# Patient Record
Sex: FEMALE | URBAN - METROPOLITAN AREA
[De-identification: names, ages, dates, MRNs, and addresses within clinical notes are randomized per-mention and may not be internally consistent; named-entity substitution may affect disease eponyms.]

---

## 2017-08-01 ENCOUNTER — APPOINTMENT (RX ONLY)
Dept: URBAN - METROPOLITAN AREA CLINIC 24 | Facility: CLINIC | Age: 62
Setting detail: DERMATOLOGY
End: 2017-08-01

## 2017-08-01 DIAGNOSIS — L90.5 SCAR CONDITIONS AND FIBROSIS OF SKIN: ICD-10-CM

## 2017-08-01 DIAGNOSIS — L57.8 OTHER SKIN CHANGES DUE TO CHRONIC EXPOSURE TO NONIONIZING RADIATION: ICD-10-CM

## 2017-08-01 PROCEDURE — ? TREATMENT REGIMEN

## 2017-08-01 PROCEDURE — ? COUNSELING

## 2017-08-01 PROCEDURE — ? OTHER (COSMETIC)

## 2017-08-01 PROCEDURE — ? PRESCRIPTION

## 2017-08-01 PROCEDURE — 99202 OFFICE O/P NEW SF 15 MIN: CPT

## 2017-08-01 RX ORDER — TRETINOIN 1 MG/G
PEA SIZE CREAM TOPICAL QHS
Qty: 1 | Refills: 6 | Status: ERX | COMMUNITY
Start: 2017-08-01

## 2017-08-01 RX ADMIN — TRETINOIN PEA SIZE: 1 CREAM TOPICAL at 20:33

## 2017-08-01 ASSESSMENT — LOCATION DETAILED DESCRIPTION DERM
LOCATION DETAILED: LEFT INFERIOR CENTRAL MALAR CHEEK
LOCATION DETAILED: RIGHT MEDIAL FOREHEAD

## 2017-08-01 ASSESSMENT — LOCATION ZONE DERM: LOCATION ZONE: FACE

## 2017-08-01 ASSESSMENT — LOCATION SIMPLE DESCRIPTION DERM
LOCATION SIMPLE: RIGHT FOREHEAD
LOCATION SIMPLE: LEFT CHEEK

## 2017-08-01 NOTE — PROCEDURE: TREATMENT REGIMEN
Samples Given: -Cetaphil Oil Control foaming wash\\n-Neutrogena ultra gentle foaming face wash\\n-Neutrogena ultra gentle creamy formula\\n-Andria SPF 50\\n-Elta MD UV Clear and UV physical
Detail Level: Zone
Plan: Has some enlarging pores and some thickening of skin likely from photo aging\\nDisc appt with  to consider chemical peels/laser\\nAware insurance will not pay for this, since pt asked about it. \\n

## 2017-08-01 NOTE — HPI: SKIN LESIONS
How Severe Is Your Skin Lesion?: mild
Have Your Skin Lesions Been Treated?: been treated
Is This A New Presentation, Or A Follow-Up?: Skin Lesions
Additional History: .\\nPatient complains of enlarging pores and texture issues with her skin.

## 2017-08-01 NOTE — PROCEDURE: OTHER (COSMETIC)
Other (Free Text): Recommend patient see [facility] in the [facility] to discuss texture changes and enlarged pores.
Detail Level: Zone